# Patient Record
Sex: FEMALE | Race: WHITE
[De-identification: names, ages, dates, MRNs, and addresses within clinical notes are randomized per-mention and may not be internally consistent; named-entity substitution may affect disease eponyms.]

---

## 2017-10-29 ENCOUNTER — HEALTH MAINTENANCE LETTER (OUTPATIENT)
Age: 27
End: 2017-10-29

## 2019-03-25 ENCOUNTER — TELEPHONE (OUTPATIENT)
Dept: FAMILY MEDICINE | Facility: CLINIC | Age: 29
End: 2019-03-25

## 2019-03-25 NOTE — TELEPHONE ENCOUNTER
Outgoing call placed to patient as it has been a few years since patient has been seen at the [facility].  When patient calls back, please see if they are interested in scheduling an appointment to re-establish care with the clinic.  If they are receiving care elsewhere, please offer to mail a release of information form and confirm mailing [email or web address]       Tahira Neo ~ Patient Representative  [address] [address]  [email or web address]  [email or web address]  Office:  [phone]  Fax:  [phone]

## 2019-03-26 NOTE — TELEPHONE ENCOUNTER
Patient called back and states she has moved away and will be having her medical needs met elsewhere.   Thank you,  Imani Marcano   for [facility]